# Patient Record
Sex: MALE
[De-identification: names, ages, dates, MRNs, and addresses within clinical notes are randomized per-mention and may not be internally consistent; named-entity substitution may affect disease eponyms.]

---

## 2023-04-23 ENCOUNTER — HOSPITAL ENCOUNTER (EMERGENCY)
Dept: HOSPITAL 12 - ER | Age: 28
Discharge: HOME | End: 2023-04-23
Payer: COMMERCIAL

## 2023-04-23 VITALS — HEIGHT: 74 IN | WEIGHT: 143 LBS | BODY MASS INDEX: 18.35 KG/M2

## 2023-04-23 DIAGNOSIS — S13.4XXA: Primary | ICD-10-CM

## 2023-04-23 DIAGNOSIS — M25.561: ICD-10-CM
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
## 2023-04-23 DIAGNOSIS — Z79.899: ICD-10-CM

## 2023-04-23 DIAGNOSIS — F17.210: ICD-10-CM

## 2023-04-23 DIAGNOSIS — R07.89: ICD-10-CM
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
## 2023-04-23 DIAGNOSIS — R07.81: ICD-10-CM

## 2023-04-23 PROCEDURE — A9150 MISC/EXPER NON-PRESCRIPT DRU: HCPCS

## 2023-04-23 PROCEDURE — A4663 DIALYSIS BLOOD PRESSURE CUFF: HCPCS

## 2023-04-23 RX ADMIN — ACETAMINOPHEN ONE MG: 500 TABLET ORAL at 17:26

## 2023-04-23 RX ADMIN — CYCLOBENZAPRINE ONE MG: 10 TABLET, FILM COATED ORAL at 17:26
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
